# Patient Record
Sex: MALE | Race: WHITE | NOT HISPANIC OR LATINO | URBAN - METROPOLITAN AREA
[De-identification: names, ages, dates, MRNs, and addresses within clinical notes are randomized per-mention and may not be internally consistent; named-entity substitution may affect disease eponyms.]

---

## 2021-11-18 ENCOUNTER — OFFICE (OUTPATIENT)
Dept: URBAN - METROPOLITAN AREA CLINIC 19 | Facility: CLINIC | Age: 31
End: 2021-11-18

## 2021-11-18 VITALS
DIASTOLIC BLOOD PRESSURE: 81 MMHG | HEART RATE: 70 BPM | OXYGEN SATURATION: 99 % | SYSTOLIC BLOOD PRESSURE: 132 MMHG | WEIGHT: 219 LBS

## 2021-11-18 DIAGNOSIS — R19.4 CHANGE IN BOWEL HABIT: ICD-10-CM

## 2021-11-18 DIAGNOSIS — K92.1 MELENA: ICD-10-CM

## 2021-11-18 DIAGNOSIS — R12 HEARTBURN: ICD-10-CM

## 2021-11-18 DIAGNOSIS — R10.33 PERIUMBILICAL PAIN: ICD-10-CM

## 2021-11-18 DIAGNOSIS — K62.5 HEMORRHAGE OF ANUS AND RECTUM: ICD-10-CM

## 2021-11-18 LAB
C-REACTIVE PROTEIN, QUANT: 2 MG/L (ref 0–10)
CBC, PLATELET, NO DIFFERENTIAL: HEMATOCRIT: 44 % (ref 37.5–51)
CBC, PLATELET, NO DIFFERENTIAL: HEMOGLOBIN: 15.5 G/DL (ref 13–17.7)
CBC, PLATELET, NO DIFFERENTIAL: MCH: 32 PG (ref 26.6–33)
CBC, PLATELET, NO DIFFERENTIAL: MCHC: 35.2 G/DL (ref 31.5–35.7)
CBC, PLATELET, NO DIFFERENTIAL: MCV: 91 FL (ref 79–97)
CBC, PLATELET, NO DIFFERENTIAL: PLATELETS: 181 X10E3/UL (ref 150–450)
CBC, PLATELET, NO DIFFERENTIAL: RBC: 4.85 X10E6/UL (ref 4.14–5.8)
CBC, PLATELET, NO DIFFERENTIAL: RDW: 12.9 % (ref 11.6–15.4)
CBC, PLATELET, NO DIFFERENTIAL: WBC: 6.4 X10E3/UL (ref 3.4–10.8)
CELIAC AB TTG DGP TIGA: DEAMIDATED GLIADIN ABS, IGA: 4 UNITS (ref 0–19)
CELIAC AB TTG DGP TIGA: DEAMIDATED GLIADIN ABS, IGG: 2 UNITS (ref 0–19)
CELIAC AB TTG DGP TIGA: IMMUNOGLOBULIN A, QN, SERUM: 342 MG/DL (ref 90–386)
CELIAC AB TTG DGP TIGA: T-TRANSGLUTAMINASE (TTG) IGA: <2 U/ML
CELIAC AB TTG DGP TIGA: T-TRANSGLUTAMINASE (TTG) IGG: 5 U/ML (ref 0–5)
COMP. METABOLIC PANEL (14): A/G RATIO: 1.8 (ref 1.2–2.2)
COMP. METABOLIC PANEL (14): ALBUMIN: 4.8 G/DL (ref 4.1–5.2)
COMP. METABOLIC PANEL (14): ALKALINE PHOSPHATASE: 87 IU/L (ref 44–121)
COMP. METABOLIC PANEL (14): ALT (SGPT): 42 IU/L (ref 0–44)
COMP. METABOLIC PANEL (14): AST (SGOT): 26 IU/L (ref 0–40)
COMP. METABOLIC PANEL (14): BILIRUBIN, TOTAL: 0.4 MG/DL (ref 0–1.2)
COMP. METABOLIC PANEL (14): BUN/CREATININE RATIO: 11 (ref 9–20)
COMP. METABOLIC PANEL (14): BUN: 12 MG/DL (ref 6–20)
COMP. METABOLIC PANEL (14): CALCIUM: 10 MG/DL (ref 8.7–10.2)
COMP. METABOLIC PANEL (14): CARBON DIOXIDE, TOTAL: 26 MMOL/L (ref 20–29)
COMP. METABOLIC PANEL (14): CHLORIDE: 102 MMOL/L (ref 96–106)
COMP. METABOLIC PANEL (14): CREATININE: 1.07 MG/DL (ref 0.76–1.27)
COMP. METABOLIC PANEL (14): EGFR IF AFRICN AM: 107 ML/MIN/1.73 (ref 59–?)
COMP. METABOLIC PANEL (14): EGFR IF NONAFRICN AM: 93 ML/MIN/1.73 (ref 59–?)
COMP. METABOLIC PANEL (14): GLOBULIN, TOTAL: 2.7 G/DL (ref 1.5–4.5)
COMP. METABOLIC PANEL (14): GLUCOSE: 84 MG/DL (ref 65–99)
COMP. METABOLIC PANEL (14): POTASSIUM: 4.4 MMOL/L (ref 3.5–5.2)
COMP. METABOLIC PANEL (14): PROTEIN, TOTAL: 7.5 G/DL (ref 6–8.5)
COMP. METABOLIC PANEL (14): SODIUM: 141 MMOL/L (ref 134–144)
SEDIMENTATION RATE-WESTERGREN: 3 MM/HR (ref 0–15)
TSH: 0.76 UIU/ML (ref 0.45–4.5)

## 2021-11-18 PROCEDURE — 99204 OFFICE O/P NEW MOD 45 MIN: CPT

## 2021-11-18 RX ORDER — PANTOPRAZOLE SODIUM 40 MG/1
40 TABLET, DELAYED RELEASE ORAL
Qty: 30 | Refills: 5 | Status: COMPLETED
Start: 2021-11-18 | End: 2022-02-01

## 2021-11-18 RX ORDER — SODIUM PICOSULFATE, MAGNESIUM OXIDE, AND ANHYDROUS CITRIC ACID 10; 3.5; 12 MG/160ML; G/160ML; G/160ML
LIQUID ORAL
Qty: 320 | Refills: 0 | Status: COMPLETED
Start: 2021-11-18 | End: 2022-02-01

## 2021-11-18 NOTE — SERVICEHPINOTES
31-year-old  white male here with his wife for change in bowel habits, hematochezia, rectal bleeding and heartburn.  He has apparently had "abnormal" bowel movements for years.  His bowel movements fluctuate between diarrhea and constipation, constipation more predominantly.  Over the last 6-8 months he has had rectal bleeding when he wipes with bowel movements.  Over the last several weeks he has had several episodes of hematochezia that have progressively worsened.  He also has noticed some periumbilical abdominal pain that seems to be worse when he coughs.  He also reports heartburn and reflux, but denies taking any antacids for this.  He is currently on Augmentin for sinusitis.  He denies dysphagia, nausea or vomiting.  He denies any previous GI tests or studies.  Family history is negative for IBD and colon neoplasm.

## 2021-11-18 NOTE — SERVICENOTES
The patient's assessment was reviewed with Dr. Trimble and a collaborative plan of care was established.

## 2021-12-03 ENCOUNTER — OFFICE (OUTPATIENT)
Dept: URBAN - METROPOLITAN AREA CLINIC 22 | Facility: CLINIC | Age: 31
End: 2021-12-03

## 2021-12-03 DIAGNOSIS — R19.4 CHANGE IN BOWEL HABIT: ICD-10-CM

## 2021-12-03 DIAGNOSIS — K92.1 MELENA: ICD-10-CM

## 2021-12-03 PROCEDURE — 74177 CT ABD & PELVIS W/CONTRAST: CPT | Mod: TC

## 2022-02-01 ENCOUNTER — AMBULATORY SURGICAL CENTER (OUTPATIENT)
Dept: URBAN - METROPOLITAN AREA SURGERY 3 | Facility: SURGERY | Age: 32
End: 2022-02-01

## 2022-02-01 ENCOUNTER — OFFICE (OUTPATIENT)
Dept: URBAN - METROPOLITAN AREA PATHOLOGY 22 | Facility: PATHOLOGY | Age: 32
End: 2022-02-01

## 2022-02-01 VITALS
RESPIRATION RATE: 16 BRPM | WEIGHT: 220 LBS | SYSTOLIC BLOOD PRESSURE: 115 MMHG | DIASTOLIC BLOOD PRESSURE: 80 MMHG | HEART RATE: 77 BPM | WEIGHT: 220 LBS | RESPIRATION RATE: 19 BRPM | SYSTOLIC BLOOD PRESSURE: 109 MMHG | DIASTOLIC BLOOD PRESSURE: 63 MMHG | SYSTOLIC BLOOD PRESSURE: 121 MMHG | WEIGHT: 220 LBS | OXYGEN SATURATION: 95 % | HEART RATE: 78 BPM | HEART RATE: 77 BPM | SYSTOLIC BLOOD PRESSURE: 121 MMHG | OXYGEN SATURATION: 97 % | DIASTOLIC BLOOD PRESSURE: 72 MMHG | SYSTOLIC BLOOD PRESSURE: 115 MMHG | DIASTOLIC BLOOD PRESSURE: 72 MMHG | SYSTOLIC BLOOD PRESSURE: 119 MMHG | SYSTOLIC BLOOD PRESSURE: 119 MMHG | HEART RATE: 78 BPM | RESPIRATION RATE: 20 BRPM | SYSTOLIC BLOOD PRESSURE: 124 MMHG | OXYGEN SATURATION: 95 % | HEART RATE: 75 BPM | DIASTOLIC BLOOD PRESSURE: 72 MMHG | RESPIRATION RATE: 20 BRPM | OXYGEN SATURATION: 93 % | HEART RATE: 78 BPM | OXYGEN SATURATION: 93 % | SYSTOLIC BLOOD PRESSURE: 121 MMHG | DIASTOLIC BLOOD PRESSURE: 63 MMHG | RESPIRATION RATE: 21 BRPM | HEART RATE: 77 BPM | SYSTOLIC BLOOD PRESSURE: 109 MMHG | HEART RATE: 75 BPM | RESPIRATION RATE: 16 BRPM | HEART RATE: 84 BPM | SYSTOLIC BLOOD PRESSURE: 109 MMHG | RESPIRATION RATE: 21 BRPM | SYSTOLIC BLOOD PRESSURE: 124 MMHG | OXYGEN SATURATION: 97 % | OXYGEN SATURATION: 97 % | SYSTOLIC BLOOD PRESSURE: 124 MMHG | SYSTOLIC BLOOD PRESSURE: 119 MMHG | HEART RATE: 84 BPM | DIASTOLIC BLOOD PRESSURE: 73 MMHG | RESPIRATION RATE: 20 BRPM | HEART RATE: 75 BPM | TEMPERATURE: 97.6 F | HEART RATE: 84 BPM | TEMPERATURE: 97.6 F | RESPIRATION RATE: 19 BRPM | OXYGEN SATURATION: 95 % | DIASTOLIC BLOOD PRESSURE: 80 MMHG | DIASTOLIC BLOOD PRESSURE: 63 MMHG | RESPIRATION RATE: 16 BRPM | SYSTOLIC BLOOD PRESSURE: 115 MMHG | DIASTOLIC BLOOD PRESSURE: 73 MMHG | RESPIRATION RATE: 19 BRPM | DIASTOLIC BLOOD PRESSURE: 80 MMHG | DIASTOLIC BLOOD PRESSURE: 73 MMHG | TEMPERATURE: 97.6 F | RESPIRATION RATE: 21 BRPM | OXYGEN SATURATION: 93 %

## 2022-02-01 DIAGNOSIS — R19.4 CHANGE IN BOWEL HABIT: ICD-10-CM

## 2022-02-01 DIAGNOSIS — K62.5 HEMORRHAGE OF ANUS AND RECTUM: ICD-10-CM

## 2022-02-01 PROBLEM — R19.8 CHANGE IN BOWEL HABIT: Status: ACTIVE | Noted: 2022-02-01

## 2022-02-01 PROCEDURE — 45380 COLONOSCOPY AND BIOPSY: CPT | Performed by: INTERNAL MEDICINE

## 2022-02-01 PROCEDURE — 88305 TISSUE EXAM BY PATHOLOGIST: CPT | Performed by: PATHOLOGY

## 2022-07-14 PROBLEM — K92.1 HEMATOCHEZIA: Status: ACTIVE | Noted: 2022-02-01

## 2022-07-14 PROBLEM — K92.2 EVALUATION OF UNEXPLAINED GI BLEEDING: Status: ACTIVE | Noted: 2022-02-01
